# Patient Record
Sex: MALE | Race: WHITE
[De-identification: names, ages, dates, MRNs, and addresses within clinical notes are randomized per-mention and may not be internally consistent; named-entity substitution may affect disease eponyms.]

---

## 2020-07-19 ENCOUNTER — HOSPITAL ENCOUNTER (EMERGENCY)
Dept: HOSPITAL 4 - SED | Age: 55
Discharge: LEFT BEFORE DISCHARGE | End: 2020-07-19
Payer: COMMERCIAL

## 2020-07-19 VITALS — WEIGHT: 245 LBS | HEIGHT: 72 IN | BODY MASS INDEX: 33.18 KG/M2

## 2020-07-19 VITALS — SYSTOLIC BLOOD PRESSURE: 128 MMHG
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
## 2020-07-19 DIAGNOSIS — S61.256A: Primary | ICD-10-CM
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
## 2020-07-19 DIAGNOSIS — Z53.21: ICD-10-CM

## 2020-07-19 NOTE — NUR
Patient did not want to wait. Patient left without being seen. No further 
treatment needed. ER MD aware